# Patient Record
Sex: MALE | Race: WHITE | NOT HISPANIC OR LATINO | Employment: FULL TIME | URBAN - METROPOLITAN AREA
[De-identification: names, ages, dates, MRNs, and addresses within clinical notes are randomized per-mention and may not be internally consistent; named-entity substitution may affect disease eponyms.]

---

## 2023-11-23 ENCOUNTER — HOSPITAL ENCOUNTER (EMERGENCY)
Facility: CLINIC | Age: 33
Discharge: HOME | End: 2023-11-23
Attending: EMERGENCY MEDICINE | Admitting: EMERGENCY MEDICINE
Payer: COMMERCIAL

## 2023-11-23 ENCOUNTER — APPOINTMENT (OUTPATIENT)
Dept: GENERAL RADIOLOGY | Facility: CLINIC | Age: 33
End: 2023-11-23
Attending: EMERGENCY MEDICINE
Payer: COMMERCIAL

## 2023-11-23 VITALS
BODY MASS INDEX: 24.44 KG/M2 | DIASTOLIC BLOOD PRESSURE: 77 MMHG | HEART RATE: 84 BPM | WEIGHT: 165 LBS | RESPIRATION RATE: 18 BRPM | TEMPERATURE: 97.7 F | SYSTOLIC BLOOD PRESSURE: 139 MMHG | HEIGHT: 69 IN | OXYGEN SATURATION: 96 %

## 2023-11-23 DIAGNOSIS — J20.8 VIRAL BRONCHITIS: ICD-10-CM

## 2023-11-23 PROCEDURE — 250N000009 HC RX 250: Performed by: EMERGENCY MEDICINE

## 2023-11-23 PROCEDURE — 71046 X-RAY EXAM CHEST 2 VIEWS: CPT

## 2023-11-23 PROCEDURE — 94640 AIRWAY INHALATION TREATMENT: CPT

## 2023-11-23 PROCEDURE — 250N000012 HC RX MED GY IP 250 OP 636 PS 637: Performed by: EMERGENCY MEDICINE

## 2023-11-23 PROCEDURE — 99284 EMERGENCY DEPT VISIT MOD MDM: CPT | Mod: 25

## 2023-11-23 RX ORDER — PREDNISONE 20 MG/1
40 TABLET ORAL DAILY
Qty: 10 TABLET | Refills: 0 | Status: SHIPPED | OUTPATIENT
Start: 2023-11-23 | End: 2023-11-23

## 2023-11-23 RX ORDER — ALBUTEROL SULFATE 90 UG/1
2 AEROSOL, METERED RESPIRATORY (INHALATION) EVERY 4 HOURS PRN
Qty: 18 G | Refills: 0 | Status: SHIPPED | OUTPATIENT
Start: 2023-11-23

## 2023-11-23 RX ORDER — PREDNISONE 20 MG/1
40 TABLET ORAL ONCE
Status: COMPLETED | OUTPATIENT
Start: 2023-11-23 | End: 2023-11-23

## 2023-11-23 RX ORDER — ALBUTEROL SULFATE 90 UG/1
2 AEROSOL, METERED RESPIRATORY (INHALATION) EVERY 4 HOURS PRN
Qty: 18 G | Refills: 0 | Status: SHIPPED | OUTPATIENT
Start: 2023-11-23 | End: 2023-11-23

## 2023-11-23 RX ORDER — PREDNISONE 20 MG/1
40 TABLET ORAL DAILY
Qty: 10 TABLET | Refills: 0 | Status: SHIPPED | OUTPATIENT
Start: 2023-11-23

## 2023-11-23 RX ORDER — IPRATROPIUM BROMIDE AND ALBUTEROL SULFATE 2.5; .5 MG/3ML; MG/3ML
3 SOLUTION RESPIRATORY (INHALATION) ONCE
Status: COMPLETED | OUTPATIENT
Start: 2023-11-23 | End: 2023-11-23

## 2023-11-23 RX ADMIN — PREDNISONE 40 MG: 20 TABLET ORAL at 22:22

## 2023-11-23 RX ADMIN — IPRATROPIUM BROMIDE AND ALBUTEROL SULFATE 3 ML: .5; 3 SOLUTION RESPIRATORY (INHALATION) at 22:22

## 2023-11-23 ASSESSMENT — ACTIVITIES OF DAILY LIVING (ADL): ADLS_ACUITY_SCORE: 35

## 2023-11-24 NOTE — ED PROVIDER NOTES
"  History     Chief Complaint:  Shortness of Breath       The history is provided by the patient and the spouse.      Willis Rodriguez is a 33 year old male who presents to the ED with shortness of breath. The patient reports that he has been experiencing trouble breathing, coughing, and chest tightness. He states that today his symptoms worsened but originally started 2 weeks ago. He reports that his lungs fee sore from coughing. He states that he is coughing up clear mucus. He reports that he did experience neck pain today while he was driving from coughing. He states that his trouble breathing is worse with sitting and better when up and moving. He reports that he took 10mL of Delsome. Spouse reports that the patient has been experiencing worsening symptoms after smoking tobacco. Patient denies fever, core throat, congestion, chest pain, difficulty swallowing, or history of asthma or lung problems. He notes that he had a similar experience 2 years ago and was given a neb and an inhaler.     Independent Historian:   Spouse/Partner - They report supplemental history.     Review of External Notes:   No visits available for years    Medications:    The patient is not currently taking any prescribed medications.    Past Medical History:    No pertinent past medical history       Physical Exam   Patient Vitals for the past 24 hrs:   BP Temp Temp src Pulse Resp SpO2 Height Weight   11/23/23 2144 139/77 97.7  F (36.5  C) Temporal 84 18 96 % 1.753 m (5' 9\") 74.8 kg (165 lb)        Physical Exam    Eyes:               Sclera white; Pupils are equal and round  ENT:                External ears and nares normal  CV:                  Rate as above with regular rhythm   Resp:               Diffuse end expiratory wheeze, frequent cough                          Non-labored, no retractions or accessory muscle use  MS:                  Moves all extremities  Skin:                Warm and dry  Neuro:             Speech is normal " and fluent. No apparent deficit.          Emergency Department Course   Imaging:  XR Chest 2 Views   Final Result   IMPRESSION: Negative chest. Lungs are clear.             Laboratory:  Labs Ordered and Resulted from Time of ED Arrival to Time of ED Departure - No data to display     Procedures     Emergency Department Course & Assessments:           Interventions:  Medications   ipratropium - albuterol 0.5 mg/2.5 mg/3 mL (DUONEB) neb solution 3 mL (3 mLs Nebulization $Given 11/23/23 2222)   predniSONE (DELTASONE) tablet 40 mg (40 mg Oral $Given 11/23/23 2222)        Independent Interpretation (X-rays, CTs, rhythm strip):  CXR - no effusion, pneumothorax, or pneumonia     Assessments/Consultations/Discussion of Management or Tests:\  ED Course as of 11/23/23 2200   Thu Nov 23, 2023 2149 I briefly obtained history and examined the patient in triage.        Social Determinants of Health affecting care:   None    Disposition:  Discharge    Impression & Plan      Medical Decision Making:  Given duration of symptoms, rapid viral testing not obtained.  CXR obtained without evidence of pneumonia.  No respiratory distress or abnormal vitals.  PE would not cause wheezing and was not evaluated for.  Blood work not indicated.  Treat for bronchitis with steroids and bronchodilators.  Felt better here after neb.  Role of smoking discussed.      Diagnosis:    ICD-10-CM    1. Viral bronchitis  J20.8            Discharge Medications:  Discharge Medication List as of 11/23/2023 11:24 PM             Scribe Disclosure:  I, Lolita Ochoamaría elena, am serving as a scribe at 10:03 PM on 11/23/2023 to document services personally performed by Denise Gaytan, * based on my observations and the provider's statements to me.     11/23/2023   Denise Gaytan, *        Denise Gaytan MD  11/24/23 0049